# Patient Record
Sex: FEMALE | Race: WHITE | NOT HISPANIC OR LATINO | Employment: UNEMPLOYED | URBAN - METROPOLITAN AREA
[De-identification: names, ages, dates, MRNs, and addresses within clinical notes are randomized per-mention and may not be internally consistent; named-entity substitution may affect disease eponyms.]

---

## 2017-05-19 ENCOUNTER — HOSPITAL ENCOUNTER (OUTPATIENT)
Dept: RADIOLOGY | Facility: MEDICAL CENTER | Age: 62
End: 2017-05-19
Attending: SPECIALIST
Payer: MEDICAID

## 2017-05-19 DIAGNOSIS — L70.9 SAPHO SYNDROME (HCC): ICD-10-CM

## 2017-05-19 DIAGNOSIS — Z12.31 VISIT FOR SCREENING MAMMOGRAM: ICD-10-CM

## 2017-05-19 DIAGNOSIS — M86.9 SAPHO SYNDROME (HCC): ICD-10-CM

## 2017-05-19 DIAGNOSIS — M85.80 SAPHO SYNDROME (HCC): ICD-10-CM

## 2017-05-19 DIAGNOSIS — L40.3 SAPHO SYNDROME (HCC): ICD-10-CM

## 2017-05-19 DIAGNOSIS — M65.9 SAPHO SYNDROME (HCC): ICD-10-CM

## 2017-05-19 PROCEDURE — 77080 DXA BONE DENSITY AXIAL: CPT

## 2017-05-19 PROCEDURE — G0202 SCR MAMMO BI INCL CAD: HCPCS

## 2018-08-23 ENCOUNTER — OFFICE VISIT (OUTPATIENT)
Dept: CARDIOLOGY | Facility: MEDICAL CENTER | Age: 63
End: 2018-08-23
Payer: MEDICAID

## 2018-08-23 VITALS
WEIGHT: 133 LBS | HEIGHT: 60 IN | SYSTOLIC BLOOD PRESSURE: 130 MMHG | BODY MASS INDEX: 26.11 KG/M2 | OXYGEN SATURATION: 97 % | HEART RATE: 71 BPM | DIASTOLIC BLOOD PRESSURE: 80 MMHG

## 2018-08-23 DIAGNOSIS — R06.02 SHORTNESS OF BREATH: ICD-10-CM

## 2018-08-23 DIAGNOSIS — R22.1 NECK MASS: ICD-10-CM

## 2018-08-23 DIAGNOSIS — R00.2 PALPITATIONS: ICD-10-CM

## 2018-08-23 DIAGNOSIS — R42 EPISODIC LIGHTHEADEDNESS: ICD-10-CM

## 2018-08-23 PROBLEM — Z87.898 HISTORY OF SEIZURES: Status: ACTIVE | Noted: 2018-08-23

## 2018-08-23 PROCEDURE — 99214 OFFICE O/P EST MOD 30 MIN: CPT | Performed by: INTERNAL MEDICINE

## 2018-08-23 ASSESSMENT — ENCOUNTER SYMPTOMS
FEVER: 0
SHORTNESS OF BREATH: 1
HEMOPTYSIS: 0
DEPRESSION: 1
NAUSEA: 0
VOMITING: 0
MYALGIAS: 0
EYE DISCHARGE: 0
PALPITATIONS: 0
BLURRED VISION: 1
SORE THROAT: 1
BRUISES/BLEEDS EASILY: 0
WHEEZING: 0
SPEECH CHANGE: 0
CHILLS: 0
ABDOMINAL PAIN: 0
EYE REDNESS: 1
COUGH: 0
EYE PAIN: 0
NERVOUS/ANXIOUS: 0
LOSS OF CONSCIOUSNESS: 0

## 2018-08-23 NOTE — PROGRESS NOTES
Chief Complaint   Patient presents with   • Abnormal EKG       Subjective:   Arely Alberto is a 62 y.o. female who presents today for new patient evaluation because of dyspnea, palpitations and lightheadedness.    She has had intermittent difficulty with dyspnea over the last couple of years.  It was occurring about once every 4 months but now it is a couple of times a day.  She feels the need to take a deep breath and her dyspnea is resolved after this.  It may be associated with some slight blurring in her vision.    She has no dyspnea on exertion, PND orthopnea.  She has had no edema.    She occasionally notes some pressure type sensation in her chest.  She rates it as 4/10 and it is in the mid anterior chest.  This is been occurring 1-2 times a day but seem to be more frequent.  This only lasts a few seconds.    She occasionally notes some brief fluttering in her chest which may last a few seconds.  This is not necessarily related to lightheadedness.    She is noting lightheadedness.  This is hard for her to describe and she cannot quite tell if it is a balance problem or actual lightheadedness.  She is not really orthostatic.  Her symptoms occur a couple of times a day and last a few seconds.    Past Medical History:   Diagnosis Date   • Arthritis    • Cancer (HCC)     Brain   • Dental disorder    • Epilepsy    • Palpitations      Past Surgical History:   Procedure Laterality Date   • UMBILICAL HERNIA REPAIR N/A 5/28/2015    Procedure: UMBILICAL HERNIA REPAIR;  Surgeon: Suresh Dexter M.D.;  Location: SURGERY SAME DAY [facility];  Service:    • OTHER      Brain tumor removed 2007   • OTHER ORTHOPEDIC SURGERY       Family History   Problem Relation Age of Onset   • Cancer Sister      Social History     Social History   • Marital status:      Spouse name: N/A   • Number of children: N/A   • Years of education: N/A     Occupational History   • Not on file.     Social History Main Topics   •  Smoking status: Never Smoker   • Smokeless tobacco: Never Used   • Alcohol use No   • Drug use: No   • Sexual activity: Not on file     Other Topics Concern   • Not on file     Social History Narrative   • No narrative on file     Allergies   Allergen Reactions   • Pcn [Penicillins] Rash     Outpatient Encounter Prescriptions as of 8/23/2018   Medication Sig Dispense Refill   • vitamin D (CHOLECALCIFEROL) 1000 UNIT Tab Take 1,000 Units by mouth every day.     • Aspirin (ASPIR-81 PO) Take  by mouth.     • levetiracetam (KEPPRA) 500 MG TABS Take 500 mg by mouth 2 times a day.     • Multiple Vitamins-Minerals (MULTIVITAMIN PO) Take  by mouth.     • Calcium Carbonate-Vitamin D (CALCIUM + D PO) Take  by mouth.     • citalopram (CELEXA) 20 MG Tab TAKE ONE TABLET BY MOUTH ONE TIME DAILY 30 Tab 2   • Phenazopyridine HCl (AZO TABS PO) Take  by mouth. 2 gummies daily     • clindamycin (CLEOCIN) 150 MG CAPS Take 150 mg by mouth as needed (1 hour to any dental procedures.).     • oxycodone-acetaminophen (PERCOCET) 5-325 MG TABS Take 1-2 Tabs by mouth every four hours as needed for Moderate Pain. 30 Tab 0   • topiramate (TOPAMAX) 100 MG TABS Take 100 mg by mouth 2 times a day.     • Nutritional Supplements (ESTROVEN PO) Take  by mouth 2 Times a Day.       No facility-administered encounter medications on file as of 8/23/2018.      Review of Systems   Constitutional: Negative for chills and fever.   HENT: Positive for sore throat. Negative for congestion.    Eyes: Positive for blurred vision and redness. Negative for pain and discharge.   Respiratory: Positive for shortness of breath. Negative for cough, hemoptysis and wheezing.    Cardiovascular: Negative for chest pain and palpitations.   Gastrointestinal: Negative for abdominal pain, nausea and vomiting.   Musculoskeletal: Positive for joint pain. Negative for myalgias.   Skin: Negative for itching and rash.   Neurological: Negative for speech change and loss of consciousness.    Endo/Heme/Allergies: Does not bruise/bleed easily.   Psychiatric/Behavioral: Positive for depression. The patient is not nervous/anxious.    All other systems reviewed and are negative.       Objective:   /80   Pulse 71   Ht 1.524 m (5')   Wt 60.3 kg (133 lb)   SpO2 97%   BMI 25.97 kg/m²     Physical Exam   Constitutional: She is oriented to person, place, and time. She appears well-developed and well-nourished. No distress.   HENT:   Head: Normocephalic and atraumatic.   Mouth/Throat: Mucous membranes are normal.   Neck: No JVD present. No thyromegaly (Nodule possibly in the right thyroid though it does appear to be somewhat lateral.  He does move with swallowing.) present.   Cardiovascular: Normal rate, regular rhythm and intact distal pulses.  Exam reveals no gallop.    No murmur heard.  Pulmonary/Chest: Effort normal and breath sounds normal. She has no rales.   Abdominal: Soft. There is no splenomegaly or hepatomegaly.   Musculoskeletal: Normal range of motion. She exhibits no edema.   Lymphadenopathy:     She has no cervical adenopathy.   Neurological: She is alert and oriented to person, place, and time. She has normal strength. She exhibits normal muscle tone.   Skin: Skin is warm and dry. No rash noted.   Psychiatric: She has a normal mood and affect. Her behavior is normal.     Lab Results   Component Value Date/Time    CHOLSTRLTOT 199 06/05/2015 12:44 PM     (H) 06/05/2015 12:44 PM    HDL 66 06/05/2015 12:44 PM    TRIGLYCERIDE 53 06/05/2015 12:44 PM       Lab Results   Component Value Date/Time    SODIUM 138 06/05/2015 12:44 PM    POTASSIUM 4.6 06/05/2015 12:44 PM    CHLORIDE 107 06/05/2015 12:44 PM    CO2 24 06/05/2015 12:44 PM    GLUCOSE 97 06/05/2015 12:44 PM    BUN 16 11/23/2015 02:45 PM    CREATININE 1.04 11/23/2015 02:45 PM     Lab Results   Component Value Date/Time    ALKPHOSPHAT 68 06/05/2015 12:44 PM    ASTSGOT 20 06/05/2015 12:44 PM    ALTSGPT 22 06/05/2015 12:44 PM     TBILIRUBIN 0.5 06/05/2015 12:44 PM      No results found for: BNPBTYPENAT     EKG from May 2018: This shows a sinus bradycardia with low voltage in the frontal plane.  Borderline first-degree AV block was also present.    Assessment:     1. Shortness of breath     2. Palpitations     3. Episodic lightheadedness     4. Neck mass: Possible thyroid nodule           Medical Decision Making:  Today's Assessment / Status / Plan:     Ms. Alberto is having difficulty with dyspnea which is quite brief and nonexertional.  She is also having some brief palpitations which are not particularly symptomatic.  Her lightheadedness is also brief and difficult for her to describe.  I suspect some of her symptoms may be due to anxiety/stress.  She is also quite concerned about her family history of coronary artery disease.  At this time, should be further risk stratified with a cardiac CT scan for calcium scoring.  We will also obtain obtain an echocardiogram.  Finally, giving what may be a thyroid nodule/right neck mass, we will obtain a ultrasound of her neck and thyroid panel. She will be asked to follow-up with her PCP in a couple of weeks.  She will follow-up with cardiology in a couple of months.

## 2018-08-23 NOTE — LETTER
[facility]   [address]   [address]   [place] [address]  Phone: [phone]  Fax: [phone]              Arely Alberto  1955    Encounter Date: 8/23/2018    Melvin Guerra M.D.          PROGRESS NOTE:  Chief Complaint   Patient presents with   • Abnormal EKG       Subjective:   Arely Alberto is a 62 y.o. female who presents today for new patient evaluation because of dyspnea, palpitations and lightheadedness.    She has had intermittent difficulty with dyspnea over the last couple of years.  It was occurring about once every 4 months but now it is a couple of times a day.  She feels the need to take a deep breath and her dyspnea is resolved after this.  It may be associated with some slight blurring in her vision.    She has no dyspnea on exertion, PND orthopnea.  She has had no edema.    She occasionally notes some pressure type sensation in her chest.  She rates it as 4/10 and it is in the mid anterior chest.  This is been occurring 1-2 times a day but seem to be more frequent.  This only lasts a few seconds.    She occasionally notes some brief fluttering in her chest which may last a few seconds.  This is not necessarily related to lightheadedness.    She is noting lightheadedness.  This is hard for her to describe and she cannot quite tell if it is a balance problem or actual lightheadedness.  She is not really orthostatic.  Her symptoms occur a couple of times a day and last a few seconds.    Past Medical History:   Diagnosis Date   • Arthritis    • Cancer (HCC)     Brain   • Dental disorder    • Epilepsy    • Palpitations      Past Surgical History:   Procedure Laterality Date   • UMBILICAL HERNIA REPAIR N/A 5/28/2015    Procedure: UMBILICAL HERNIA REPAIR;  Surgeon: Suresh Dexter M.D.;  Location: SURGERY SAME DAY [facility];  Service:    • OTHER      Brain tumor removed 2007   • OTHER ORTHOPEDIC  SURGERY       Family History   Problem Relation Age of Onset   • Cancer Sister      Social History     Social History   • Marital status:      Spouse name: N/A   • Number of children: N/A   • Years of education: N/A     Occupational History   • Not on file.     Social History Main Topics   • Smoking status: Never Smoker   • Smokeless tobacco: Never Used   • Alcohol use No   • Drug use: No   • Sexual activity: Not on file     Other Topics Concern   • Not on file     Social History Narrative   • No narrative on file     Allergies   Allergen Reactions   • Pcn [Penicillins] Rash     Outpatient Encounter Prescriptions as of 8/23/2018   Medication Sig Dispense Refill   • vitamin D (CHOLECALCIFEROL) 1000 UNIT Tab Take 1,000 Units by mouth every day.     • Aspirin (ASPIR-81 PO) Take  by mouth.     • levetiracetam (KEPPRA) 500 MG TABS Take 500 mg by mouth 2 times a day.     • Multiple Vitamins-Minerals (MULTIVITAMIN PO) Take  by mouth.     • Calcium Carbonate-Vitamin D (CALCIUM + D PO) Take  by mouth.     • citalopram (CELEXA) 20 MG Tab TAKE ONE TABLET BY MOUTH ONE TIME DAILY 30 Tab 2   • Phenazopyridine HCl (AZO TABS PO) Take  by mouth. 2 gummies daily     • clindamycin (CLEOCIN) 150 MG CAPS Take 150 mg by mouth as needed (1 hour to any dental procedures.).     • oxycodone-acetaminophen (PERCOCET) 5-325 MG TABS Take 1-2 Tabs by mouth every four hours as needed for Moderate Pain. 30 Tab 0   • topiramate (TOPAMAX) 100 MG TABS Take 100 mg by mouth 2 times a day.     • Nutritional Supplements (ESTROVEN PO) Take  by mouth 2 Times a Day.       No facility-administered encounter medications on file as of 8/23/2018.      Review of Systems   Constitutional: Negative for chills and fever.   HENT: Positive for sore throat. Negative for congestion.    Eyes: Positive for blurred vision and redness. Negative for pain and discharge.   Respiratory: Positive for shortness of breath. Negative for cough, hemoptysis and wheezing.       Cardiovascular: Negative for chest pain and palpitations.   Gastrointestinal: Negative for abdominal pain, nausea and vomiting.   Musculoskeletal: Positive for joint pain. Negative for myalgias.   Skin: Negative for itching and rash.   Neurological: Negative for speech change and loss of consciousness.   Endo/Heme/Allergies: Does not bruise/bleed easily.   Psychiatric/Behavioral: Positive for depression. The patient is not nervous/anxious.    All other systems reviewed and are negative.       Objective:   /80   Pulse 71   Ht 1.524 m (5')   Wt 60.3 kg (133 lb)   SpO2 97%   BMI 25.97 kg/m²      Physical Exam   Constitutional: She is oriented to person, place, and time. She appears well-developed and well-nourished. No distress.   HENT:   Head: Normocephalic and atraumatic.   Mouth/Throat: Mucous membranes are normal.   Neck: No JVD present. No thyromegaly (Nodule possibly in the right thyroid though it does appear to be somewhat lateral.  He does move with swallowing.) present.   Cardiovascular: Normal rate, regular rhythm and intact distal pulses.  Exam reveals no gallop.    No murmur heard.  Pulmonary/Chest: Effort normal and breath sounds normal. She has no rales.   Abdominal: Soft. There is no splenomegaly or hepatomegaly.   Musculoskeletal: Normal range of motion. She exhibits no edema.   Lymphadenopathy:     She has no cervical adenopathy.   Neurological: She is alert and oriented to person, place, and time. She has normal strength. She exhibits normal muscle tone.   Skin: Skin is warm and dry. No rash noted.   Psychiatric: She has a normal mood and affect. Her behavior is normal.     Lab Results   Component Value Date/Time    CHOLSTRLTOT 199 06/05/2015 12:44 PM     (H) 06/05/2015 12:44 PM    HDL 66 06/05/2015 12:44 PM    TRIGLYCERIDE 53 06/05/2015 12:44 PM       Lab Results   Component Value Date/Time    SODIUM 138 06/05/2015 12:44 PM    POTASSIUM 4.6 06/05/2015 12:44 PM    CHLORIDE 107  06/05/2015 12:44 PM    CO2 24 06/05/2015 12:44 PM    GLUCOSE 97 06/05/2015 12:44 PM    BUN 16 11/23/2015 02:45 PM    CREATININE 1.04 11/23/2015 02:45 PM     Lab Results   Component Value Date/Time    ALKPHOSPHAT 68 06/05/2015 12:44 PM    ASTSGOT 20 06/05/2015 12:44 PM    ALTSGPT 22 06/05/2015 12:44 PM    TBILIRUBIN 0.5 06/05/2015 12:44 PM      No results found for: BNPBTYPENAT     Assessment:     1. Shortness of breath     2. Palpitations     3. Episodic lightheadedness     4. Neck mass: Possible thyroid nodule           Medical Decision Making:  Today's Assessment / Status / Plan:     Ms. Alberto is having difficulty with dyspnea which is quite brief and nonexertional.  She is also having some brief palpitations which are not particularly symptomatic.  Her lightheadedness is also brief and difficult for her to describe.  I suspect some of her symptoms may be due to anxiety/stress.  She is also quite concerned about her family history of coronary artery disease.  At this time, should be further risk stratified with a cardiac CT scan for calcium scoring.  We will also obtain obtain an echocardiogram.  Finally, giving what may be a thyroid nodule/right neck mass, we will obtain a ultrasound of her neck.  She will be asked to follow-up with her PCP in a couple of weeks.  She will follow-up with cardiology in a couple of months.      Leticia Polanco A.P.R.NSanta  [address] [place] [id number]  VIA Facsimile: [phone]

## 2018-08-29 ENCOUNTER — HOSPITAL ENCOUNTER (OUTPATIENT)
Dept: LAB | Facility: MEDICAL CENTER | Age: 63
End: 2018-08-29
Attending: INTERNAL MEDICINE
Payer: MEDICAID

## 2018-08-29 LAB
T4 FREE SERPL-MCNC: 0.93 NG/DL (ref 0.53–1.43)
TSH SERPL DL<=0.005 MIU/L-ACNC: 2.82 UIU/ML (ref 0.38–5.33)

## 2018-08-29 PROCEDURE — 84443 ASSAY THYROID STIM HORMONE: CPT

## 2018-08-29 PROCEDURE — 84439 ASSAY OF FREE THYROXINE: CPT

## 2018-08-29 PROCEDURE — 36415 COLL VENOUS BLD VENIPUNCTURE: CPT

## 2018-09-06 ENCOUNTER — APPOINTMENT (OUTPATIENT)
Dept: CARDIOLOGY | Facility: MEDICAL CENTER | Age: 63
End: 2018-09-06
Attending: INTERNAL MEDICINE
Payer: MEDICAID

## 2018-10-09 ENCOUNTER — HOSPITAL ENCOUNTER (OUTPATIENT)
Dept: RADIOLOGY | Facility: MEDICAL CENTER | Age: 63
End: 2018-10-09
Attending: INTERNAL MEDICINE
Payer: MEDICAID

## 2018-10-09 ENCOUNTER — HOSPITAL ENCOUNTER (OUTPATIENT)
Dept: CARDIOLOGY | Facility: MEDICAL CENTER | Age: 63
End: 2018-10-09
Attending: INTERNAL MEDICINE
Payer: MEDICAID

## 2018-10-09 DIAGNOSIS — R22.1 NECK MASS: ICD-10-CM

## 2018-10-09 DIAGNOSIS — R00.2 PALPITATIONS: ICD-10-CM

## 2018-10-09 DIAGNOSIS — R06.02 SHORTNESS OF BREATH: ICD-10-CM

## 2018-10-09 PROCEDURE — 76536 US EXAM OF HEAD AND NECK: CPT

## 2018-10-09 PROCEDURE — 93306 TTE W/DOPPLER COMPLETE: CPT

## 2018-10-09 PROCEDURE — 93306 TTE W/DOPPLER COMPLETE: CPT | Mod: 26 | Performed by: INTERNAL MEDICINE

## 2018-10-10 LAB
LV EJECT FRACT  99904: 65
LV EJECT FRACT MOD 2C 99903: 70.72
LV EJECT FRACT MOD 4C 99902: 64.96
LV EJECT FRACT MOD BP 99901: 66.56

## 2018-11-01 ENCOUNTER — TELEPHONE (OUTPATIENT)
Dept: CARDIOLOGY | Facility: MEDICAL CENTER | Age: 63
End: 2018-11-01

## 2018-11-01 NOTE — TELEPHONE ENCOUNTER
EC-ECHOCARDIOGRAM COMPLETE W/O CONT   Order: [id number]   Status:  Final result   Visible to patient:  Yes (MyChart) Dx:  Palpitations; Shortness of breath   Notes recorded by Melvin Guerra M.D. on 10/31/2018 at 5:09 PM PDT  Patient canceled appointment, please reschedule ASAP KRISTINE okay  ------    Notes recorded by Jenny Cohen R.N. on 10/11/2018 at 11:31 AM PDT  Pt scheduled to see you 10/31     Appt re-scheduled (held)    Pt called. No answer, voicemail left with request for call back and contact information.

## 2018-11-02 ENCOUNTER — TELEPHONE (OUTPATIENT)
Dept: CARDIOLOGY | Facility: MEDICAL CENTER | Age: 63
End: 2018-11-02

## 2018-11-02 NOTE — TELEPHONE ENCOUNTER
Message   Received: 2 days ago   Message Contents   SURJIT Han R.N.             Please call patient and see if you can obtain the phone number of her PCP.  Patient needs follow-up on the ultrasound of her neck ASAP.    Previous Messages               US-SOFT TISSUES OF HEAD - NECK   Order: [id number]   Status:  Final result   Visible to patient:  Yes (MyChart) Dx:  Neck mass: Possible thyroid nodule   Notes recorded by Melvin Guerra M.D. on 10/31/2018 at 5:00 PM PDT  See prior message.  Patient needs follow-up with PCP and PCP needs a copy of her ultrasound.  ------    Notes recorded by Jenny Cohen R.N. on 10/10/2018 at 3:55 PM PDT  Pt scheduled to see FK 10/31  ADD, please advise   Details           Pt called. No answer, voicemail left with request for call back and contact information.